# Patient Record
Sex: MALE | Race: WHITE | NOT HISPANIC OR LATINO | URBAN - METROPOLITAN AREA
[De-identification: names, ages, dates, MRNs, and addresses within clinical notes are randomized per-mention and may not be internally consistent; named-entity substitution may affect disease eponyms.]

---

## 2024-06-10 ENCOUNTER — EMERGENCY (EMERGENCY)
Facility: HOSPITAL | Age: 49
LOS: 1 days | Discharge: HOME | End: 2024-06-10
Attending: EMERGENCY MEDICINE
Payer: COMMERCIAL

## 2024-06-10 VITALS
OXYGEN SATURATION: 97 % | TEMPERATURE: 98 F | WEIGHT: 315 LBS | HEIGHT: 75 IN | RESPIRATION RATE: 16 BRPM | HEART RATE: 105 BPM | SYSTOLIC BLOOD PRESSURE: 184 MMHG | DIASTOLIC BLOOD PRESSURE: 115 MMHG

## 2024-06-10 LAB
ALBUMIN SERPL ELPH-MCNC: 4.5 G/DL — SIGNIFICANT CHANGE UP (ref 3.3–5)
ALP SERPL-CCNC: 82 U/L — SIGNIFICANT CHANGE UP (ref 40–120)
ALT FLD-CCNC: 30 U/L — SIGNIFICANT CHANGE UP (ref 10–45)
ANION GAP SERPL CALC-SCNC: 15 MMOL/L — SIGNIFICANT CHANGE UP (ref 5–17)
AST SERPL-CCNC: 21 U/L — SIGNIFICANT CHANGE UP (ref 10–40)
BASE EXCESS BLDV CALC-SCNC: 3.4 MMOL/L — HIGH (ref -2–3)
BASOPHILS # BLD AUTO: 0.05 K/UL — SIGNIFICANT CHANGE UP (ref 0–0.2)
BASOPHILS NFR BLD AUTO: 0.5 % — SIGNIFICANT CHANGE UP (ref 0–2)
BILIRUB SERPL-MCNC: 0.4 MG/DL — SIGNIFICANT CHANGE UP (ref 0.2–1.2)
BUN SERPL-MCNC: 12 MG/DL — SIGNIFICANT CHANGE UP (ref 7–23)
CA-I SERPL-SCNC: 1.18 MMOL/L — SIGNIFICANT CHANGE UP (ref 1.15–1.33)
CALCIUM SERPL-MCNC: 9.6 MG/DL — SIGNIFICANT CHANGE UP (ref 8.4–10.5)
CHLORIDE BLDV-SCNC: 100 MMOL/L — SIGNIFICANT CHANGE UP (ref 96–108)
CHLORIDE SERPL-SCNC: 99 MMOL/L — SIGNIFICANT CHANGE UP (ref 96–108)
CO2 BLDV-SCNC: 32 MMOL/L — HIGH (ref 22–26)
CO2 SERPL-SCNC: 25 MMOL/L — SIGNIFICANT CHANGE UP (ref 22–31)
CREAT SERPL-MCNC: 1.02 MG/DL — SIGNIFICANT CHANGE UP (ref 0.5–1.3)
EGFR: 90 ML/MIN/1.73M2 — SIGNIFICANT CHANGE UP
EOSINOPHIL # BLD AUTO: 0.03 K/UL — SIGNIFICANT CHANGE UP (ref 0–0.5)
EOSINOPHIL NFR BLD AUTO: 0.3 % — SIGNIFICANT CHANGE UP (ref 0–6)
GAS PNL BLDV: 135 MMOL/L — LOW (ref 136–145)
GAS PNL BLDV: SIGNIFICANT CHANGE UP
GAS PNL BLDV: SIGNIFICANT CHANGE UP
GLUCOSE BLDC GLUCOMTR-MCNC: 196 MG/DL — HIGH (ref 70–99)
GLUCOSE BLDC GLUCOMTR-MCNC: 251 MG/DL — HIGH (ref 70–99)
GLUCOSE BLDV-MCNC: 278 MG/DL — HIGH (ref 70–99)
GLUCOSE SERPL-MCNC: 256 MG/DL — HIGH (ref 70–99)
HCO3 BLDV-SCNC: 31 MMOL/L — HIGH (ref 22–29)
HCT VFR BLD CALC: 41.1 % — SIGNIFICANT CHANGE UP (ref 39–50)
HCT VFR BLDA CALC: 44 % — SIGNIFICANT CHANGE UP (ref 39–51)
HGB BLD CALC-MCNC: 14.5 G/DL — SIGNIFICANT CHANGE UP (ref 12.6–17.4)
HGB BLD-MCNC: 13.7 G/DL — SIGNIFICANT CHANGE UP (ref 13–17)
IMM GRANULOCYTES NFR BLD AUTO: 0.5 % — SIGNIFICANT CHANGE UP (ref 0–0.9)
LACTATE BLDV-MCNC: 1.7 MMOL/L — SIGNIFICANT CHANGE UP (ref 0.5–2)
LYMPHOCYTES # BLD AUTO: 1.76 K/UL — SIGNIFICANT CHANGE UP (ref 1–3.3)
LYMPHOCYTES # BLD AUTO: 18.4 % — SIGNIFICANT CHANGE UP (ref 13–44)
MCHC RBC-ENTMCNC: 30.2 PG — SIGNIFICANT CHANGE UP (ref 27–34)
MCHC RBC-ENTMCNC: 33.3 GM/DL — SIGNIFICANT CHANGE UP (ref 32–36)
MCV RBC AUTO: 90.7 FL — SIGNIFICANT CHANGE UP (ref 80–100)
MONOCYTES # BLD AUTO: 0.77 K/UL — SIGNIFICANT CHANGE UP (ref 0–0.9)
MONOCYTES NFR BLD AUTO: 8 % — SIGNIFICANT CHANGE UP (ref 2–14)
NEUTROPHILS # BLD AUTO: 6.91 K/UL — SIGNIFICANT CHANGE UP (ref 1.8–7.4)
NEUTROPHILS NFR BLD AUTO: 72.3 % — SIGNIFICANT CHANGE UP (ref 43–77)
NRBC # BLD: 0 /100 WBCS — SIGNIFICANT CHANGE UP (ref 0–0)
PCO2 BLDV: 57 MMHG — HIGH (ref 42–55)
PH BLDV: 7.34 — SIGNIFICANT CHANGE UP (ref 7.32–7.43)
PLATELET # BLD AUTO: 159 K/UL — SIGNIFICANT CHANGE UP (ref 150–400)
PO2 BLDV: 31 MMHG — SIGNIFICANT CHANGE UP (ref 25–45)
POTASSIUM BLDV-SCNC: 4.3 MMOL/L — SIGNIFICANT CHANGE UP (ref 3.5–5.1)
POTASSIUM SERPL-MCNC: 4.3 MMOL/L — SIGNIFICANT CHANGE UP (ref 3.5–5.3)
POTASSIUM SERPL-SCNC: 4.3 MMOL/L — SIGNIFICANT CHANGE UP (ref 3.5–5.3)
PROT SERPL-MCNC: 8.7 G/DL — HIGH (ref 6–8.3)
RBC # BLD: 4.53 M/UL — SIGNIFICANT CHANGE UP (ref 4.2–5.8)
RBC # FLD: 13.4 % — SIGNIFICANT CHANGE UP (ref 10.3–14.5)
SAO2 % BLDV: 56.3 % — LOW (ref 67–88)
SODIUM SERPL-SCNC: 139 MMOL/L — SIGNIFICANT CHANGE UP (ref 135–145)
WBC # BLD: 9.57 K/UL — SIGNIFICANT CHANGE UP (ref 3.8–10.5)
WBC # FLD AUTO: 9.57 K/UL — SIGNIFICANT CHANGE UP (ref 3.8–10.5)

## 2024-06-10 PROCEDURE — 99223 1ST HOSP IP/OBS HIGH 75: CPT

## 2024-06-10 PROCEDURE — 73090 X-RAY EXAM OF FOREARM: CPT | Mod: 26,LT

## 2024-06-10 PROCEDURE — 73080 X-RAY EXAM OF ELBOW: CPT | Mod: 26,LT

## 2024-06-10 RX ORDER — SODIUM CHLORIDE 9 MG/ML
1000 INJECTION, SOLUTION INTRAVENOUS
Refills: 0 | Status: DISCONTINUED | OUTPATIENT
Start: 2024-06-10 | End: 2024-06-13

## 2024-06-10 RX ORDER — CEFAZOLIN SODIUM 1 G
1000 VIAL (EA) INJECTION EVERY 8 HOURS
Refills: 0 | Status: DISCONTINUED | OUTPATIENT
Start: 2024-06-10 | End: 2024-06-13

## 2024-06-10 RX ORDER — ATORVASTATIN CALCIUM 80 MG/1
20 TABLET, FILM COATED ORAL AT BEDTIME
Refills: 0 | Status: DISCONTINUED | OUTPATIENT
Start: 2024-06-10 | End: 2024-06-13

## 2024-06-10 RX ORDER — ACETAMINOPHEN 500 MG
1000 TABLET ORAL ONCE
Refills: 0 | Status: COMPLETED | OUTPATIENT
Start: 2024-06-10 | End: 2024-06-10

## 2024-06-10 RX ORDER — DEXTROSE 50 % IN WATER 50 %
25 SYRINGE (ML) INTRAVENOUS ONCE
Refills: 0 | Status: DISCONTINUED | OUTPATIENT
Start: 2024-06-10 | End: 2024-06-13

## 2024-06-10 RX ORDER — CEFAZOLIN SODIUM 1 G
1000 VIAL (EA) INJECTION ONCE
Refills: 0 | Status: COMPLETED | OUTPATIENT
Start: 2024-06-10 | End: 2024-06-10

## 2024-06-10 RX ORDER — DEXTROSE 10 % IN WATER 10 %
125 INTRAVENOUS SOLUTION INTRAVENOUS ONCE
Refills: 0 | Status: DISCONTINUED | OUTPATIENT
Start: 2024-06-10 | End: 2024-06-13

## 2024-06-10 RX ORDER — INSULIN LISPRO 100/ML
VIAL (ML) SUBCUTANEOUS AT BEDTIME
Refills: 0 | Status: DISCONTINUED | OUTPATIENT
Start: 2024-06-10 | End: 2024-06-13

## 2024-06-10 RX ORDER — GLUCAGON INJECTION, SOLUTION 0.5 MG/.1ML
1 INJECTION, SOLUTION SUBCUTANEOUS ONCE
Refills: 0 | Status: DISCONTINUED | OUTPATIENT
Start: 2024-06-10 | End: 2024-06-13

## 2024-06-10 RX ORDER — METFORMIN HYDROCHLORIDE 850 MG/1
1000 TABLET ORAL
Refills: 0 | Status: DISCONTINUED | OUTPATIENT
Start: 2024-06-10 | End: 2024-06-13

## 2024-06-10 RX ORDER — CEFAZOLIN SODIUM 1 G
VIAL (EA) INJECTION
Refills: 0 | Status: DISCONTINUED | OUTPATIENT
Start: 2024-06-10 | End: 2024-06-13

## 2024-06-10 RX ORDER — DEXTROSE 50 % IN WATER 50 %
12.5 SYRINGE (ML) INTRAVENOUS ONCE
Refills: 0 | Status: DISCONTINUED | OUTPATIENT
Start: 2024-06-10 | End: 2024-06-13

## 2024-06-10 RX ORDER — DEXTROSE 50 % IN WATER 50 %
15 SYRINGE (ML) INTRAVENOUS ONCE
Refills: 0 | Status: DISCONTINUED | OUTPATIENT
Start: 2024-06-10 | End: 2024-06-13

## 2024-06-10 RX ORDER — INSULIN LISPRO 100/ML
VIAL (ML) SUBCUTANEOUS
Refills: 0 | Status: DISCONTINUED | OUTPATIENT
Start: 2024-06-10 | End: 2024-06-13

## 2024-06-10 RX ADMIN — Medication 1000 MILLIGRAM(S): at 13:28

## 2024-06-10 RX ADMIN — Medication 100 MILLIGRAM(S): at 12:31

## 2024-06-10 RX ADMIN — Medication 100 MILLIGRAM(S): at 22:35

## 2024-06-10 RX ADMIN — Medication 100 MILLIGRAM(S): at 13:28

## 2024-06-10 RX ADMIN — METFORMIN HYDROCHLORIDE 1000 MILLIGRAM(S): 850 TABLET ORAL at 23:53

## 2024-06-10 RX ADMIN — ATORVASTATIN CALCIUM 20 MILLIGRAM(S): 80 TABLET, FILM COATED ORAL at 22:35

## 2024-06-10 RX ADMIN — Medication 100 MILLIGRAM(S): at 12:17

## 2024-06-10 NOTE — ED CDU PROVIDER DISPOSITION NOTE - PATIENT PORTAL LINK FT
You can access the FollowMyHealth Patient Portal offered by [facility] by registering at the following website: [email or web address]. By joining [employer]’s FollowMyHealth portal, you will also be able to view your health information using other applications (apps) compatible with our system.

## 2024-06-10 NOTE — ED CDU PROVIDER DISPOSITION NOTE - CLINICAL COURSE
48-year-old left-hand-dominant male past medical history of gout, hyperlipidemia, diabetes on metformin (A1c last month in 8s) presents to ED complaining of atraumatic left elbow erythema and swelling extending down the forearm to the hand.  Patient first noticed symptoms last Tuesday worsening in severity over the weekend.  Felt feverish on Friday.  No chills.  Denies pain.  Was previously taking indomethacin as he was suspicious this could be gout.  Patient's prior gout flares are in the lower extremities.  Never in the upper extremities.  ED course reviewed. temp noted to be 99.1F. patient had lab work and imaging done. no acute pathology noted. patient started on IV abx. sent to cdu for IV abx and frequent re-evaluations. will continue to monitor.  In CDU: 48-year-old left-hand-dominant male past medical history of gout, hyperlipidemia, diabetes on metformin (A1c last month in 8s) presents to ED complaining of atraumatic left elbow erythema and swelling extending down the forearm to the hand.  Patient first noticed symptoms last Tuesday worsening in severity over the weekend.  Felt feverish on Friday.  No chills.  Denies pain.  Was previously taking indomethacin as he was suspicious this could be gout.  Patient's prior gout flares are in the lower extremities.  Never in the upper extremities.  ED course reviewed. temp noted to be 99.1F. patient had lab work and imaging done. no acute pathology noted. patient started on IV abx. sent to cdu for IV abx and frequent re-evaluations. will continue to monitor.  In CDU, pt with stable VS. Afebrile. Pt reports improvement in L elbow/forearm swelling. Erythema over left elbow receded from demarcated lines outlined yesterday. Able to range L elbow w/o difficulty. Minimally tender to palpation.  No fluctuance. Advised to continue oral abx and apply ice PRN for swelling. Will f/u with PMD. Patient stable for discharge.

## 2024-06-10 NOTE — ED CDU PROVIDER INITIAL DAY NOTE - OBJECTIVE STATEMENT
48-year-old left-hand-dominant male past medical history of gout, hyperlipidemia, diabetes on metformin (A1c last month in 8s) presents to ED complaining of atraumatic left elbow erythema and swelling extending down the forearm to the hand.  Patient first noticed symptoms last Tuesday worsening in severity over the weekend.  Felt feverish on Friday.  No chills.  Denies pain.  Was previously taking indomethacin as he was suspicious this could be gout.  Patient's prior gout flares are in the lower extremities.  Never in the upper extremities.  ED course reviewed. temp noted to be 99.1F. patient had lab work and imaging done. no acute pathology noted. patient started on IV abx. sent to cdu for IV abx and frequent re-evaluations. will continue to monitor.

## 2024-06-10 NOTE — ED ADULT NURSE NOTE - OBJECTIVE STATEMENT
1150 49 y/o m to er ambulatory alone w/c/o L elbow swelling and redness x several days, did have pain to area at one point but not at present, also fever earlier in the week.

## 2024-06-10 NOTE — ED CDU PROVIDER DISPOSITION NOTE - ATTENDING APP SHARED VISIT CONTRIBUTION OF CARE
Emergency Medicine Attending MD Encinas: :  I have personally performed a face to face diagnostic evaluation on this patient with the PA.  I have reviewed the ACP note and agree with the history, exam, and plan of care, except as noted.  History and Exam by me shows a 48-year-old male, sent to the CDU for antibiotic treatment of left elbow cellulitis versus olecranon bursitis.    Patient has a medical history of gout, but typically gets flares in his lower extremities.  Has never had an issue with one of his elbows.      Patient endorsed a 1 week history of atraumatic left elbow pain, redness, swelling that extended down through the forearm into the hand.    Patient endorsed prior fevers 5 days ago, no chills.  Medical history significant for diabetes.  Blood glucose 200s while in the ED.    Patient was given cefazolin and doxycycline in the ED and sent to the CDU for IV antibiotic therapy and clinical reassessment.  Patient endorses significant clinical improvement in pain swelling and redness.  Initial white blood cell count 9.57, this morning white blood cell count is 7.74.    VS: wnl  Gen: Well appearing adult male, in NAD  Head: NC/AT  Neck: trachea midline  Resp:  No distress  CV: RRR, no RMG  Abd: nondistended  Left upper extremity: Leading edge of erythema over the elbow has receded from the edge that was demarcated in the ED yesterday.  Patient still has an area of induration and erythema over the olecranon.  Minimally tender to palpation.  No fluctuance, no drainage.  Neuro:  A&Ox4 appears non focal  Skin:  Warm and dry as visualized  Psych:  Normal affect and mood    Medical Decision Making / Differential Diagnosis:  HPI most consistent with resolving left elbow cellulitis versus olecranon bursitis.      Given patient's significant clinical improvement on antibiotics, the patient should be discharged on Keflex and doxycycline.    The patient is reliable, lives in [facility], and understands all indications to return to the ED: Recurrent fevers, chills, recurrent erythema despite antibiotic therapy.

## 2024-06-10 NOTE — ED PROVIDER NOTE - PROGRESS NOTE DETAILS
Phil Kwan PA-C: Left elbow and forearm x-rays are normal.  No leukocytosis on labs.  Patient started on Ancef and Doxy.  Patient agreeable to overnight stay in CDU for IV antibiotics for left upper extremity cellulitis.  Patient excepted by CDU PA.

## 2024-06-10 NOTE — ED CDU PROVIDER INITIAL DAY NOTE - PROGRESS NOTE DETAILS
CDU PROGRESS NOTE SOUMYA CARPIO: Received pt at 1900 sign-out. Case/plan reviewed. Pt resting in stretcher in NAD. VSS. On exam, + left upper extremity reveals mild swelling and faint erythema of the olecranon process, faint erythema warmth to the medial forearm, no fluctuance or tenderness to palpation.  Patient has full flexion and extension of the left elbow without pain. The radial pulse is easily palpable, forearm compartment soft.  Sensation intact to light touch throughout. No crepitus or fluctuance appreciated. Pt without complaints. Area demarcated, will continue IV abx and monitor overnight.

## 2024-06-10 NOTE — ED PROVIDER NOTE - ATTENDING APP SHARED VISIT CONTRIBUTION OF CARE
Attending MD Tucker: I personally made/approved the management plan and take responsibility for the patient management.

## 2024-06-10 NOTE — ED ADULT NURSE NOTE - ED STAT RN HANDOFF DETAILS 2
Report given to Isadora HERNANDES RN. patient is in no acute distress. Patient vital signs stable, plan of care explained, in blue

## 2024-06-10 NOTE — ED CDU PROVIDER DISPOSITION NOTE - NSFOLLOWUPINSTRUCTIONS_ED_ALL_ED_FT
1. It is important to follow up with your primary care doctor in 1-2 days    2. bring a copy of all your results to your follow up appointments    3. you can take Tylenol as needed for pain. you can take 650mg of Tylenol every 6 hours as needed for pain. do not take more than 4000mg in a 24 hour period.     4. if your symptoms worsen, persist, or if any new symptoms develop, or if you experience any signs of distress, return to the ER right away. Please make sure to follow up with your primary care doctor within 1-2 days.  Bring a copy of all of your results with you to your follow up appointments.   Return to the ER as discussed if you develop any new or worsening symptoms.    Continue the antibiotics as prescribed. Take as directed.   Make sure to apply ice as needed for swelling.     You may take Tylenol 1000mg every 6 hours as needed for pain and/or Ibuprofen 400mg every 6-8 hours as needed for pain. Take Ibuprofen with food. Do not take Ibuprofen with Indomethacin.

## 2024-06-10 NOTE — ED PROVIDER NOTE - CLINICAL SUMMARY MEDICAL DECISION MAKING FREE TEXT BOX
Attending MD Tucker: 48-year-old gentleman history of gout, diabetes presenting for evaluation 6 days of atraumatic left posterior elbow pain and now 1 day of spreading redness down the left forearm.  Patient states he had swelling in the elbow but never any pain in the elbow.  He felt "feverish" 3 days prior but that resolved.  Today what concerned him was that the swelling and redness is spread to his forearm.  No known trauma to the area.  Patient also reports taking indomethacin because he thought this could be gout for 1 to 2 days but that did not help.    Vital signs on arrival notable for tachycardia heart rate 105 elevated blood pressure 184/115 otherwise nonactionable.  Patient sitting in the stretcher in no apparent distress.  Breathing comfortably on room air.  Focused exam of left upper extremity reveals mild swelling and faint erythema of the olecranon process, faint erythema warmth to the medial forearm, no fluctuance or tenderness to palpation.  Patient has full flexion and extension of the left elbow without pain.  The radial pulse is easily palpable, forearm compartment soft.  Sensation intact to light touch throughout.    Patient presenting for evaluation of atraumatic swelling and redness to the left posterior elbow and forearm.  History and examination are consistent with likely cellulitis.  Considered septic joint however given full range of motion of the elbow not likely.  Also considered DVT however patient does not have any swelling of the upper arm and his skin changes are centered about the olecranon process.  Plan at this time will be to obtain screening labs screening x-ray initiate IV antibiotics and will observe in observation unit overnight to ensure clinical improvement.             *The above represents an initial assessment/impression. Please refer to progress notes for potential changes in patient clinical course*

## 2024-06-10 NOTE — ED CDU PROVIDER DISPOSITION NOTE - WET READ LAUNCH FT
Message   Recorded as Task   Date: 03/21/2017 09:52 AM, Created By: Eri Luz   Task Name: Provider Clinical Communication   Assigned To: Eri Luz   Regarding Patient: MANUEL DORAN, Status: Active   Comment:    Eri Luz - 21 Mar 2017 9:52 AM     TASK CREATED  Patient LM stating she has many questions call back [phone].   Eri Luz - 21 Mar 2017 11:21 AM     TASK REASSIGNED: Previously Assigned To Eri Luz  Patient was phoned back and states that she has applied for disability through [employer], reports that there was a records request sent and they have not yet received any information. Can you please verify if this was received? Thank you!   Tiny Lu - 21 Mar 2017 1:47 PM     TASK REPLIED TO: Previously Assigned To Tiny Lu  Verified faxed number with pt and asked her to have them refax documents.   Tiny Lu - 21 Mar 2017 1:49 PM     TASK REASSIGNED: Previously Assigned To Tiny Lu     Signatures   Electronically signed by : Eri Luz R.N.; Mar 21 2017  3:34PM CST    
There are no Wet Read(s) to document.

## 2024-06-10 NOTE — ED CDU PROVIDER INITIAL DAY NOTE - PHYSICAL EXAMINATION
GEN: Pt non-toxic in NAD, alert.  PSYCH: Affect and mood appropriate.  EYES: Sclera white w/o injection.  ENT: Neck supple. Airway patent.  RESP: CTA b/l.  CARDIAC: RRR, S1, S2, no M/G/R.  ABD: Soft, NT.  MSK: Left elbow erythema, warmth, swelling extending down the forearm to the dorsal aspect of the hand.  No crepitus or fluctuance appreciated.  There is full range of motion at the shoulder elbow and wrist. NV intact distally.  NEURO: Nonfocal.  VASC: Strong distal pulses.  SKIN: See MSK.

## 2024-06-10 NOTE — ED PROVIDER NOTE - OBJECTIVE STATEMENT
48-year-old left-hand-dominant male past medical history of gout, hyperlipidemia, diabetes on metformin (A1c last month in 8s) presents to ED complaining of atraumatic left elbow erythema and swelling extending down the forearm to the hand.  Patient first noticed symptoms last Tuesday worsening in severity over the weekend.  Felt feverish on Friday.  No chills.  Denies pain.  Was previously taking indomethacin as he was suspicious this could be gout.  Patient's prior gout flares are in the lower extremities.  Never in the upper extremities.

## 2024-06-11 VITALS
OXYGEN SATURATION: 95 % | TEMPERATURE: 98 F | RESPIRATION RATE: 18 BRPM | SYSTOLIC BLOOD PRESSURE: 123 MMHG | DIASTOLIC BLOOD PRESSURE: 88 MMHG | HEART RATE: 79 BPM

## 2024-06-11 LAB
BASOPHILS # BLD AUTO: 0.04 K/UL — SIGNIFICANT CHANGE UP (ref 0–0.2)
BASOPHILS NFR BLD AUTO: 0.5 % — SIGNIFICANT CHANGE UP (ref 0–2)
EOSINOPHIL # BLD AUTO: 0.02 K/UL — SIGNIFICANT CHANGE UP (ref 0–0.5)
EOSINOPHIL NFR BLD AUTO: 0.3 % — SIGNIFICANT CHANGE UP (ref 0–6)
GLUCOSE BLDC GLUCOMTR-MCNC: 223 MG/DL — HIGH (ref 70–99)
HCT VFR BLD CALC: 37.6 % — LOW (ref 39–50)
HGB BLD-MCNC: 12.5 G/DL — LOW (ref 13–17)
IMM GRANULOCYTES NFR BLD AUTO: 0.3 % — SIGNIFICANT CHANGE UP (ref 0–0.9)
LYMPHOCYTES # BLD AUTO: 2.12 K/UL — SIGNIFICANT CHANGE UP (ref 1–3.3)
LYMPHOCYTES # BLD AUTO: 27.4 % — SIGNIFICANT CHANGE UP (ref 13–44)
MCHC RBC-ENTMCNC: 30.3 PG — SIGNIFICANT CHANGE UP (ref 27–34)
MCHC RBC-ENTMCNC: 33.2 GM/DL — SIGNIFICANT CHANGE UP (ref 32–36)
MCV RBC AUTO: 91 FL — SIGNIFICANT CHANGE UP (ref 80–100)
MONOCYTES # BLD AUTO: 0.65 K/UL — SIGNIFICANT CHANGE UP (ref 0–0.9)
MONOCYTES NFR BLD AUTO: 8.4 % — SIGNIFICANT CHANGE UP (ref 2–14)
NEUTROPHILS # BLD AUTO: 4.89 K/UL — SIGNIFICANT CHANGE UP (ref 1.8–7.4)
NEUTROPHILS NFR BLD AUTO: 63.1 % — SIGNIFICANT CHANGE UP (ref 43–77)
NRBC # BLD: 0 /100 WBCS — SIGNIFICANT CHANGE UP (ref 0–0)
PLATELET # BLD AUTO: 152 K/UL — SIGNIFICANT CHANGE UP (ref 150–400)
RBC # BLD: 4.13 M/UL — LOW (ref 4.2–5.8)
RBC # FLD: 13.3 % — SIGNIFICANT CHANGE UP (ref 10.3–14.5)
WBC # BLD: 7.74 K/UL — SIGNIFICANT CHANGE UP (ref 3.8–10.5)
WBC # FLD AUTO: 7.74 K/UL — SIGNIFICANT CHANGE UP (ref 3.8–10.5)

## 2024-06-11 PROCEDURE — 96376 TX/PRO/DX INJ SAME DRUG ADON: CPT

## 2024-06-11 PROCEDURE — G0378: CPT

## 2024-06-11 PROCEDURE — 36415 COLL VENOUS BLD VENIPUNCTURE: CPT

## 2024-06-11 PROCEDURE — 85014 HEMATOCRIT: CPT

## 2024-06-11 PROCEDURE — 96365 THER/PROPH/DIAG IV INF INIT: CPT

## 2024-06-11 PROCEDURE — 84295 ASSAY OF SERUM SODIUM: CPT

## 2024-06-11 PROCEDURE — 73080 X-RAY EXAM OF ELBOW: CPT

## 2024-06-11 PROCEDURE — 82962 GLUCOSE BLOOD TEST: CPT

## 2024-06-11 PROCEDURE — 85018 HEMOGLOBIN: CPT

## 2024-06-11 PROCEDURE — 82435 ASSAY OF BLOOD CHLORIDE: CPT

## 2024-06-11 PROCEDURE — 80053 COMPREHEN METABOLIC PANEL: CPT

## 2024-06-11 PROCEDURE — 99239 HOSP IP/OBS DSCHRG MGMT >30: CPT

## 2024-06-11 PROCEDURE — 82330 ASSAY OF CALCIUM: CPT

## 2024-06-11 PROCEDURE — 82803 BLOOD GASES ANY COMBINATION: CPT

## 2024-06-11 PROCEDURE — 83605 ASSAY OF LACTIC ACID: CPT

## 2024-06-11 PROCEDURE — 84132 ASSAY OF SERUM POTASSIUM: CPT

## 2024-06-11 PROCEDURE — 87040 BLOOD CULTURE FOR BACTERIA: CPT

## 2024-06-11 PROCEDURE — 96368 THER/DIAG CONCURRENT INF: CPT

## 2024-06-11 PROCEDURE — 85025 COMPLETE CBC W/AUTO DIFF WBC: CPT

## 2024-06-11 PROCEDURE — 73090 X-RAY EXAM OF FOREARM: CPT

## 2024-06-11 PROCEDURE — 82947 ASSAY GLUCOSE BLOOD QUANT: CPT

## 2024-06-11 PROCEDURE — 99284 EMERGENCY DEPT VISIT MOD MDM: CPT | Mod: 25

## 2024-06-11 RX ORDER — CEPHALEXIN 500 MG
1 CAPSULE ORAL
Qty: 28 | Refills: 0
Start: 2024-06-11 | End: 2024-06-17

## 2024-06-11 RX ADMIN — Medication 100 MILLIGRAM(S): at 04:09

## 2024-06-11 RX ADMIN — Medication 2: at 09:05

## 2024-06-11 RX ADMIN — Medication 100 MILLIGRAM(S): at 05:35

## 2024-06-11 RX ADMIN — METFORMIN HYDROCHLORIDE 1000 MILLIGRAM(S): 850 TABLET ORAL at 09:06

## 2024-06-11 NOTE — ED CDU PROVIDER SUBSEQUENT DAY NOTE - ATTENDING APP SHARED VISIT CONTRIBUTION OF CARE
Emergency Medicine Attending MD Encinas: :  I have personally performed a face to face diagnostic evaluation on this patient with the PA.  I have reviewed the ACP note and agree with the history, exam, and plan of care, except as noted.  History and Exam by me shows a 48-year-old male, sent to the CDU for antibiotic treatment of left elbow cellulitis versus olecranon bursitis.    Patient has a medical history of gout, but typically gets flares in his lower extremities.  Has never had an issue with one of his elbows.      Patient endorsed a 1 week history of atraumatic left elbow pain, redness, swelling that extended down through the forearm into the hand.    Patient endorsed prior fevers 5 days ago, no chills.  Medical history significant for diabetes.  Blood glucose 200s while in the ED.    Patient was given cefazolin and doxycycline in the ED and sent to the CDU for IV antibiotic therapy and clinical reassessment.  Patient endorses significant clinical improvement in pain swelling and redness.  Initial white blood cell count 9.57, this morning white blood cell count is 7.74.    VS: wnl  Gen: Well appearing adult male, in NAD  Head: NC/AT  Neck: trachea midline  Resp:  No distress  CV: RRR, no RMG  Abd: nondistended  Left upper extremity: Leading edge of erythema over the elbow has receded from the edge that was demarcated in the ED yesterday.  Patient still has an area of induration and erythema over the olecranon.  Minimally tender to palpation.  No fluctuance, no drainage.  Neuro:  A&Ox4 appears non focal  Skin:  Warm and dry as visualized  Psych:  Normal affect and mood    Medical Decision Making / Differential Diagnosis:  HPI most consistent with resolving left elbow cellulitis versus olecranon bursitis.      Given patient's significant clinical improvement on antibiotics, the patient should be discharged on Keflex and doxycycline.    The patient is reliable, lives in [facility], and understands all indications to return to the ED: Recurrent fevers, chills, recurrent erythema despite antibiotic therapy.

## 2024-06-11 NOTE — ED CDU PROVIDER SUBSEQUENT DAY NOTE - HISTORY
CDU PROGRESS NOTE PA BALAJI: No interval change from prior exam, Pt resting in stretcher in NAD. VSS. + Left upper extremity w/ mild swelling and faint erythema of the olecranon process, faint erythema warmth to the medial forearm, no fluctuance or tenderness to palpation.  Patient has full flexion and extension of the left elbow without pain.  The radial pulse is easily palpable, forearm compartment soft.  Sensation intact to light touch throughout. No crepitus or fluctuance appreciated. Erythema contained within demarcations applied earlier. Will continue to monitor.

## 2024-06-11 NOTE — ED CDU PROVIDER SUBSEQUENT DAY NOTE - PHYSICAL EXAMINATION
GEN: Pt non-toxic in NAD, alert.  PSYCH: Affect and mood appropriate.  EYES: Sclera white w/o injection.  ENT: Neck supple. Airway patent.  RESP: CTA b/l.  CARDIAC: RRR, S1, S2, no M/G/R.  ABD: Soft, NT.  MSK: + Left upper extremity w/ mild swelling and faint erythema of the olecranon process, faint erythema warmth to the medial forearm, no fluctuance or tenderness to palpation.  Patient has full flexion and extension of the left elbow without pain.  The radial pulse is easily palpable, forearm compartment soft.  Sensation intact to light touch throughout. No crepitus or fluctuance appreciated.   NEURO: Nonfocal.  VASC: Strong distal pulses.  SKIN: See MSK.

## 2024-06-11 NOTE — ED CDU PROVIDER SUBSEQUENT DAY NOTE - PROGRESS NOTE DETAILS
Pt received at sign out, VSS, afebrile. Pt reports improvement in L elbow/forearm swelling. Erythema over left elbow receded from demarcated lines outlined yesterday. Able to range L elbow w/o difficulty. Minimally tender to palpation.  No fluctuance. Advised to continue oral abx and apply ice PRN for swelling. Will f/u with PMD. Patient stable for discharge.  Follow up instructions given, return to ED precautions reviewed. Importance of follow up emphasized, patient verbalized understanding.  All questions answered. Case dw Dr. Encinas. Brant Valero PA-C

## 2024-06-11 NOTE — ED CDU PROVIDER SUBSEQUENT DAY NOTE - CLINICAL SUMMARY MEDICAL DECISION MAKING FREE TEXT BOX
Medical Decision Making / Differential Diagnosis:  HPI most consistent with resolving left elbow cellulitis versus olecranon bursitis.      Given patient's significant clinical improvement on antibiotics, the patient should be discharged on Keflex and doxycycline.    The patient is reliable, lives in [facility], and understands all indications to return to the ED: Recurrent fevers, chills, recurrent erythema despite antibiotic therapy.

## 2024-06-11 NOTE — ED ADULT NURSE REASSESSMENT NOTE - NS ED NURSE REASSESS COMMENT FT1
09.30  Pt is evaluated by CDU MD Huang Ceballos . pt is feeling better.  Pt is discharged . Ml rohini Guo  explained the follow up care & Printed the discharge summary.Pt has stable vitals steady gait A&OX 4 at the time of Discharge
1345 Report taken from Donnell MELO pending pt to be transported Dex
1400 Pt brought over from [place] Pt ambulatory Pt declined pain meds at this time will continue to monitor MPRN
Pt received from KAMERON Faust at 19:00hrs. Pt A&O x 4. Pt is observed in CDU for celllitis Lt arm. Pt continue to have swelling and redness Lt arm. Pt denies any chest pain, SOB, dizziness or palpitations. IV antibiotics continued. Denies pain. V/S stable, IV18G Rt AC, patent and free of signs of infiltration. OOB independently. Safety & comfort measures maintained. Call bell in reach. Will continue to monitor.
pt c/o itching on the L wrist after the IV ABx (  finished, no another complaints, pt denies sob, difficulty swallowing, no cp, MD Tucker at the bedside, no interventions at this time , will keep monitor
07.00 Received the Pt from  KAMERON Du  Pt is Observed for Left arm pain . Received the Pt A&OX 4  Pt obeys commands Windy N/V/D fever chills cp SOB   Comfort care & safety measures continued  IV site looks clean & dry no signs of infiltration noted pt denies  pain IV site .Pt is oriented to the unit Plan of care explained .  Pt is advised to call for help  call bell with in the reach pt verbalized the understanding .  pending CDU  MD rivera . GCS 15/15 A&OX 4 PERRLA  size 3 Strong upper & lower extremities steady gait  Pt is ambulatory & independent   No facial droop  No Hand Leg drop denies numbness tingling   Pt states he feels better  left elbow selling is reducing ROM  + Radial & brachial pulse pt is not in pain  Plan of care ongoing

## 2024-06-15 LAB
CULTURE RESULTS: SIGNIFICANT CHANGE UP
CULTURE RESULTS: SIGNIFICANT CHANGE UP
SPECIMEN SOURCE: SIGNIFICANT CHANGE UP
SPECIMEN SOURCE: SIGNIFICANT CHANGE UP